# Patient Record
Sex: MALE | URBAN - METROPOLITAN AREA
[De-identification: names, ages, dates, MRNs, and addresses within clinical notes are randomized per-mention and may not be internally consistent; named-entity substitution may affect disease eponyms.]

---

## 2017-09-26 ENCOUNTER — OFFICE VISIT (OUTPATIENT)
Dept: URGENT CARE | Facility: CLINIC | Age: 47
End: 2017-09-26
Payer: COMMERCIAL

## 2017-09-26 VITALS
TEMPERATURE: 98 F | SYSTOLIC BLOOD PRESSURE: 128 MMHG | OXYGEN SATURATION: 98 % | BODY MASS INDEX: 28.6 KG/M2 | RESPIRATION RATE: 20 BRPM | WEIGHT: 230 LBS | HEIGHT: 75 IN | HEART RATE: 88 BPM | DIASTOLIC BLOOD PRESSURE: 85 MMHG

## 2017-09-26 DIAGNOSIS — R30.0 DYSURIA: Primary | ICD-10-CM

## 2017-09-26 DIAGNOSIS — J45.31 MILD PERSISTENT ASTHMA WITH ACUTE EXACERBATION: ICD-10-CM

## 2017-09-26 LAB
BILIRUB UR QL STRIP: NEGATIVE
GLUCOSE UR QL STRIP: NEGATIVE
KETONES UR QL STRIP: NEGATIVE
LEUKOCYTE ESTERASE UR QL STRIP: NEGATIVE
PH, POC UA: 6 (ref 5–8)
POC BLOOD, URINE: NEGATIVE
POC NITRATES, URINE: NEGATIVE
PROT UR QL STRIP: NEGATIVE
SP GR UR STRIP: 1.01 (ref 1–1.03)
UROBILINOGEN UR STRIP-ACNC: NORMAL (ref 0.3–2.2)

## 2017-09-26 PROCEDURE — 3008F BODY MASS INDEX DOCD: CPT | Mod: S$GLB,,, | Performed by: FAMILY MEDICINE

## 2017-09-26 PROCEDURE — 99202 OFFICE O/P NEW SF 15 MIN: CPT | Mod: 25,S$GLB,, | Performed by: FAMILY MEDICINE

## 2017-09-26 PROCEDURE — 81003 URINALYSIS AUTO W/O SCOPE: CPT | Mod: QW,S$GLB,, | Performed by: FAMILY MEDICINE

## 2017-09-26 RX ORDER — MESALAMINE 500 MG/1
500 CAPSULE, EXTENDED RELEASE ORAL 4 TIMES DAILY
COMMUNITY

## 2017-09-26 RX ORDER — FLUTICASONE PROPIONATE AND SALMETEROL 500; 50 UG/1; UG/1
1 POWDER RESPIRATORY (INHALATION) 2 TIMES DAILY
COMMUNITY
End: 2021-06-28

## 2017-09-26 RX ORDER — ASCORBIC ACID 1000 MG
175 TABLET ORAL DAILY
COMMUNITY

## 2017-09-26 RX ORDER — ALBUTEROL SULFATE 0.63 MG/3ML
0.63 SOLUTION RESPIRATORY (INHALATION) EVERY 6 HOURS PRN
COMMUNITY

## 2017-09-26 RX ORDER — CLONAZEPAM 1 MG/1
1 TABLET ORAL 2 TIMES DAILY PRN
COMMUNITY

## 2017-09-26 RX ORDER — ALPRAZOLAM 0.5 MG/1
0.5 TABLET ORAL 3 TIMES DAILY
COMMUNITY

## 2017-09-26 RX ORDER — PREDNISONE 10 MG/1
10 TABLET ORAL DAILY
COMMUNITY
End: 2023-01-06

## 2017-09-26 NOTE — PROGRESS NOTES
"Subjective:       Patient ID: Alex Tinsley is a 47 y.o. male.    Vitals:  height is 6' 3" (1.905 m) and weight is 104.3 kg (230 lb). His oral temperature is 97.7 °F (36.5 °C). His blood pressure is 128/85 and his pulse is 88. His respiration is 20 and oxygen saturation is 98%.     Chief Complaint: Dysuria and Cough    Dysuria    This is a new problem. The current episode started yesterday. The problem occurs every urination. The problem has been gradually worsening. The quality of the pain is described as burning. The pain is at a severity of 3/10. The pain is mild. There has been no fever. The fever has been present for less than 1 day. He is sexually active. Associated symptoms include chills and nausea. Pertinent negatives include no hematuria, urgency, vomiting or rash. He has tried nothing for the symptoms. The treatment provided no relief.   Cough   This is a new problem. The current episode started 1 to 4 weeks ago. The problem has been gradually worsening. The problem occurs constantly. The cough is productive of sputum. Associated symptoms include chills and a fever. Pertinent negatives include no rash. Nothing aggravates the symptoms. He has tried steroid inhaler for the symptoms. The treatment provided mild relief.     Review of Systems   Constitution: Positive for chills and fever.   Eyes: Negative for discharge.   Respiratory: Positive for cough.    Skin: Negative for flushing and rash.   Musculoskeletal: Negative for back pain.   Gastrointestinal: Positive for nausea. Negative for vomiting.   Genitourinary: Positive for dysuria. Negative for genital sores, hematuria and urgency.       Objective:      Physical Exam   Constitutional: He appears well-developed and well-nourished.   HENT:   Head: Normocephalic and atraumatic.   Mouth/Throat: Posterior oropharyngeal erythema present.   Eyes: EOM are normal. Pupils are equal, round, and reactive to light.   Neck: Normal range of motion. Neck supple. "   Cardiovascular: Normal rate, regular rhythm and normal heart sounds.    Pulmonary/Chest: Effort normal. No respiratory distress. He has wheezes (scant). He has no rales. He exhibits no tenderness.   Abdominal: Soft. Bowel sounds are normal.   Nursing note and vitals reviewed.      Assessment:       1. Dysuria    2. Mild persistent asthma with acute exacerbation        Plan:         Dysuria  -     POCT Urinalysis, Dipstick, Automated, W/O Scope    Mild persistent asthma with acute exacerbation    declines further testing of urine, denies risk for STD. Believes he has a cold and is comfortable with his asthma management

## 2017-09-26 NOTE — PATIENT INSTRUCTIONS
Dysuria with Uncertain Cause (Adult)    The urethra is the tube that allows urine to pass out of the body. In a woman, the urethra is the opening above the vagina. In men, the urethra is the opening on the tip of the penis. Dysuria is the feeling of pain or burning in the urethra when passing urine.  Dysuria can be caused by anything that irritates or inflames the urethra. An infection or chemical irritation can cause this reaction. A bladder infection is the most common cause of dysuria in adults. A urine test can diagnose this. A bladder infection needs antibiotic treatment.  Soaps, lotions, colognes and feminine hygiene products can cause dysuria. So can birth control jellies, creams, and foams. It will go away 1 to 3 days after using these irritants.  Sexually transmitted diseases (STDs) such as chlamydia or gonorrhea can cause dysuria. Your healthcare provider may take a culture sample. Your provider may start you on antibiotic medicine before the culture test returns.  In women who have gone through menopause, dysuria can be from dryness in the lining of the urethra. This can be treated with hormones. Dysuria becomes long-term (chronic) when it lasts for weeks or months. You may need to see a specialist (urologist) to diagnose and treat chronic dysuria.  Home care  These home care tips may help:  · Don't use any chemicals or products that you think may be causing your symptoms.  · If you were given a prescription medicine, take as directed. Be sure to take it until it is all used up.  · If a culture was taken, don't have sex until you have been told that it is negative. This means you don't have an infection. Then follow your healthcare provider's advice to treat your condition.  If a culture was done and it is positive:  · Both you and your sexual partner may need to be treated. This is true even if your partner has no symptoms.  · Contact your healthcare provider or go to an urgent care clinic or the  [facility] health department to be looked at and treated.  · Don't have sex until both you and your partner(s) have finished all antibiotics and your healthcare provider says you are no longer contagious.  · Learn about and use safe sex practices. The safest sex is with a partner who has tested negative and only has sex with you. Condoms can prevent STDs from spreading, but they aren't a guarantee.  Follow-up care  Follow up with your healthcare provider, or as advised. If a culture was taken, you may call as directed for the results. If you have an STD, follow up with your provider or the public health department for a complete STD screening, including HIV testing. For more information, contact CDC-INFO at 705-498-1516.  When to seek medical advice  Call your healthcare provider right away if any of these occur:  · You aren't better after 3 days of treatment  · Fever of 100.4ºF (38ºC) or higher, or as directed by your healthcare provider  · Back or belly pain that gets worse  · You can't urinate because of pain  · New discharge from the urethra, vagina, or penis  · Painful sores on the penis  · Rash or joint pain  · Painful lumps (lymph nodes) in the groin  · Testicle pain or swelling of the scrotum  Date Last Reviewed: 11/1/2016  © 9792-2799 [employer]. [address] [place] [address], [place] [address]. All rights reserved. This information is not intended as a substitute for professional medical care. Always follow your healthcare professional's instructions.

## 2017-09-27 ENCOUNTER — TELEPHONE (OUTPATIENT)
Dept: URGENT CARE | Facility: CLINIC | Age: 47
End: 2017-09-27

## 2017-09-27 NOTE — TELEPHONE ENCOUNTER
We got labs back on patient today. They came from [facility]. None was ordered from here. It was from his GI doctor from last week. He has already talked to his doctor concerning these labs. He is doing ok.

## 2020-02-26 ENCOUNTER — OFFICE VISIT (OUTPATIENT)
Dept: URGENT CARE | Facility: CLINIC | Age: 50
End: 2020-02-26
Payer: COMMERCIAL

## 2020-02-26 VITALS
WEIGHT: 240 LBS | HEIGHT: 75 IN | BODY MASS INDEX: 29.84 KG/M2 | HEART RATE: 86 BPM | SYSTOLIC BLOOD PRESSURE: 147 MMHG | RESPIRATION RATE: 12 BRPM | OXYGEN SATURATION: 98 % | TEMPERATURE: 98 F | DIASTOLIC BLOOD PRESSURE: 78 MMHG

## 2020-02-26 DIAGNOSIS — Z20.828 EXPOSURE TO THE FLU: Primary | ICD-10-CM

## 2020-02-26 PROCEDURE — 99203 PR OFFICE/OUTPT VISIT, NEW, LEVL III, 30-44 MIN: ICD-10-PCS | Mod: S$GLB,,, | Performed by: NURSE PRACTITIONER

## 2020-02-26 PROCEDURE — 99203 OFFICE O/P NEW LOW 30 MIN: CPT | Mod: S$GLB,,, | Performed by: NURSE PRACTITIONER

## 2020-02-26 RX ORDER — OSELTAMIVIR PHOSPHATE 75 MG/1
75 CAPSULE ORAL 2 TIMES DAILY
Qty: 10 CAPSULE | Refills: 0 | Status: SHIPPED | OUTPATIENT
Start: 2020-02-26 | End: 2020-02-26

## 2020-02-26 RX ORDER — OSELTAMIVIR PHOSPHATE 75 MG/1
75 CAPSULE ORAL 2 TIMES DAILY
Qty: 10 CAPSULE | Refills: 0 | Status: SHIPPED | OUTPATIENT
Start: 2020-02-26 | End: 2020-03-02

## 2020-02-26 NOTE — PROGRESS NOTES
"Subjective:       Patient ID: Alex Tinsley is a 49 y.o. male.    Vitals:  height is 6' 3" (1.905 m) and weight is 108.9 kg (240 lb). His temperature is 97.6 °F (36.4 °C). His blood pressure is 147/78 (abnormal) and his pulse is 86. His respiration is 12 and oxygen saturation is 98%.     Chief Complaint: Sinus Problem    Pt's wife was seen today and thinks that she may have the flu, pt would like RX   Provider note begins below  No fevers or body aches.  Requesting prophylactic tamiflu.     Sinus Problem   This is a new problem. The current episode started today. The problem has been gradually worsening since onset. He is experiencing no pain. Associated symptoms include sinus pressure and a sore throat. Pertinent negatives include no chills, congestion, coughing, diaphoresis, ear pain or shortness of breath. Past treatments include nothing.       Constitution: Negative for chills, sweating, fatigue and fever.   HENT: Positive for sinus pain, sinus pressure and sore throat. Negative for ear pain, congestion and voice change.    Neck: Negative for painful lymph nodes.   Eyes: Negative for eye redness.   Respiratory: Negative for chest tightness, cough, sputum production, bloody sputum, COPD, shortness of breath, stridor, wheezing and asthma.    Gastrointestinal: Negative for nausea and vomiting.   Musculoskeletal: Negative for muscle ache.   Skin: Negative for rash.   Allergic/Immunologic: Negative for seasonal allergies and asthma.   Hematologic/Lymphatic: Negative for swollen lymph nodes.       Objective:      Physical Exam   Constitutional: He is oriented to person, place, and time. He appears well-developed and well-nourished. He is cooperative.  Non-toxic appearance. He does not have a sickly appearance. He does not appear ill. No distress.   HENT:   Head: Normocephalic and atraumatic.   Right Ear: Hearing, tympanic membrane, external ear and ear canal normal.   Left Ear: Hearing, tympanic membrane, external ear " and ear canal normal.   Nose: Nose normal. No mucosal edema, rhinorrhea or nasal deformity. No epistaxis. Right sinus exhibits no maxillary sinus tenderness and no frontal sinus tenderness. Left sinus exhibits no maxillary sinus tenderness and no frontal sinus tenderness.   Mouth/Throat: Uvula is midline, oropharynx is clear and moist and mucous membranes are normal. No trismus in the jaw. Normal dentition. No uvula swelling. No oropharyngeal exudate, posterior oropharyngeal edema or posterior oropharyngeal erythema.   Eyes: Conjunctivae and lids are normal. No scleral icterus.   Neck: Trachea normal, full passive range of motion without pain and phonation normal. Neck supple. No neck rigidity. No edema and no erythema present.   Cardiovascular: Normal rate, regular rhythm, normal heart sounds, intact distal pulses and normal pulses.   Pulmonary/Chest: Effort normal and breath sounds normal. No stridor. No respiratory distress. He has no decreased breath sounds. He has no wheezes. He has no rhonchi.   Abdominal: Normal appearance.   Musculoskeletal: Normal range of motion. He exhibits no edema or deformity.   Neurological: He is alert and oriented to person, place, and time. He exhibits normal muscle tone. Coordination normal.   Skin: Skin is warm, dry, intact, not diaphoretic and not pale.   Psychiatric: He has a normal mood and affect. His speech is normal and behavior is normal. Judgment and thought content normal. Cognition and memory are normal.   Nursing note and vitals reviewed.        Assessment:       1. Exposure to the flu        Plan:       Discussed benefits and risks of taking tamiflu.    Exposure to the flu  -     oseltamivir (TAMIFLU) 75 MG capsule; Take 1 capsule (75 mg total) by mouth 2 (two) times daily. for 5 days  Dispense: 10 capsule; Refill: 0         Patient Instructions     Influenza (Adult)    Influenza is also called the flu. It is a viral illness that affects the air passages of your  lungs. It is different from the common cold. The flu can easily be passed from one to person to another. It may be spread through the air by coughing and sneezing. Or it can be spread by touching the sick person and then touching your own eyes, nose, or mouth.  The flu starts 1 to 3 days after you are exposed to the flu virus. It may last for 1 to 2 weeks but many people feel tired or fatigued for many weeks afterward. You usually dont need to take antibiotics unless you have a complication. This might be an ear or sinus infection or pneumonia.  Symptoms of the flu may be mild or severe. They can include extreme tiredness (wanting to stay in bed all day), chills, fevers, muscle aches, soreness with eye movement, headache, and a dry, hacking cough.  Home care  Follow these guidelines when caring for yourself at home:  · Avoid being around cigarette smoke, whether yours or other peoples.  · Acetaminophen or ibuprofen will help ease your fever, muscle aches, and headache. Dont give aspirin to anyone younger than 18 who has the flu. Aspirin can harm the liver.  · Nausea and loss of appetite are common with the flu. Eat light meals. Drink 6 to 8 glasses of liquids every day. Good choices are water, sport drinks, soft drinks without caffeine, juices, tea, and soup. Extra fluids will also help loosen secretions in your nose and lungs.  · Over-the-counter cold medicines will not make the flu go away faster. But the medicines may help with coughing, sore throat, and congestion in your nose and sinuses. Dont use a decongestant if you have high blood pressure.  · Stay home until your fever has been gone for at least 24 hours without using medicine to reduce fever.  Follow-up care  Follow up with your healthcare provider, or as advised, if you are not getting better over the next week.  If you are age 65 or older, talk with your provider about getting a pneumococcal vaccine every 5 years. You should also get this vaccine if  you have chronic asthma or COPD. All adults should get a flu vaccine every fall. Ask your provider about this.  When to seek medical advice  Call your healthcare provider right away if any of these occur:  · Cough with lots of colored mucus (sputum) or blood in your mucus  · Chest pain, shortness of breath, wheezing, or trouble breathing  · Severe headache, or face, neck, or ear pain  · New rash with fever  · Fever of 100.4°F (38°C) or higher, or as directed by your healthcare provider  · Confusion, behavior change, or seizure  · Severe weakness or dizziness  · You get a new fever or cough after getting better for a few days  Date Last Reviewed: 1/1/2017  © 9359-6043 [employer]. [address] [place] [address], [place] [address]. All rights reserved. This information is not intended as a substitute for professional medical care. Always follow your healthcare professional's instructions.

## 2020-02-26 NOTE — PATIENT INSTRUCTIONS

## 2021-06-28 ENCOUNTER — OFFICE VISIT (OUTPATIENT)
Dept: PULMONOLOGY | Facility: CLINIC | Age: 51
End: 2021-06-28
Payer: COMMERCIAL

## 2021-06-28 VITALS
SYSTOLIC BLOOD PRESSURE: 123 MMHG | HEIGHT: 75 IN | DIASTOLIC BLOOD PRESSURE: 84 MMHG | WEIGHT: 256.38 LBS | BODY MASS INDEX: 31.88 KG/M2 | RESPIRATION RATE: 18 BRPM | OXYGEN SATURATION: 97 % | HEART RATE: 76 BPM

## 2021-06-28 DIAGNOSIS — J45.30 MILD PERSISTENT ASTHMA, UNSPECIFIED WHETHER COMPLICATED: Primary | ICD-10-CM

## 2021-06-28 PROCEDURE — 1126F AMNT PAIN NOTED NONE PRSNT: CPT | Mod: S$GLB,,, | Performed by: INTERNAL MEDICINE

## 2021-06-28 PROCEDURE — 99213 OFFICE O/P EST LOW 20 MIN: CPT | Mod: S$GLB,,, | Performed by: INTERNAL MEDICINE

## 2021-06-28 PROCEDURE — 3008F BODY MASS INDEX DOCD: CPT | Mod: CPTII,S$GLB,, | Performed by: INTERNAL MEDICINE

## 2021-06-28 PROCEDURE — 99999 PR PBB SHADOW E&M-EST. PATIENT-LVL IV: ICD-10-PCS | Mod: PBBFAC,,, | Performed by: INTERNAL MEDICINE

## 2021-06-28 PROCEDURE — 99213 PR OFFICE/OUTPT VISIT, EST, LEVL III, 20-29 MIN: ICD-10-PCS | Mod: S$GLB,,, | Performed by: INTERNAL MEDICINE

## 2021-06-28 PROCEDURE — 99999 PR PBB SHADOW E&M-EST. PATIENT-LVL IV: CPT | Mod: PBBFAC,,, | Performed by: INTERNAL MEDICINE

## 2021-06-28 PROCEDURE — 1126F PR PAIN SEVERITY QUANTIFIED, NO PAIN PRESENT: ICD-10-PCS | Mod: S$GLB,,, | Performed by: INTERNAL MEDICINE

## 2021-06-28 PROCEDURE — 3008F PR BODY MASS INDEX (BMI) DOCUMENTED: ICD-10-PCS | Mod: CPTII,S$GLB,, | Performed by: INTERNAL MEDICINE

## 2021-06-28 RX ORDER — ALBUTEROL SULFATE 90 UG/1
1 AEROSOL, METERED RESPIRATORY (INHALATION) EVERY 6 HOURS PRN
Qty: 24 G | Refills: 3 | Status: SHIPPED | OUTPATIENT
Start: 2021-06-28 | End: 2022-06-28

## 2021-06-28 RX ORDER — FLUTICASONE PROPIONATE AND SALMETEROL 250; 50 UG/1; UG/1
1 POWDER RESPIRATORY (INHALATION) 2 TIMES DAILY
Qty: 1 EACH | Refills: 11 | Status: SHIPPED | OUTPATIENT
Start: 2021-06-28 | End: 2023-01-03 | Stop reason: SDUPTHER

## 2021-07-08 ENCOUNTER — OFFICE VISIT (OUTPATIENT)
Dept: URGENT CARE | Facility: CLINIC | Age: 51
End: 2021-07-08
Payer: COMMERCIAL

## 2021-07-08 VITALS
WEIGHT: 256 LBS | SYSTOLIC BLOOD PRESSURE: 127 MMHG | TEMPERATURE: 99 F | HEART RATE: 84 BPM | OXYGEN SATURATION: 96 % | DIASTOLIC BLOOD PRESSURE: 76 MMHG | RESPIRATION RATE: 18 BRPM | HEIGHT: 75 IN | BODY MASS INDEX: 31.83 KG/M2

## 2021-07-08 DIAGNOSIS — R42 DIZZINESS: Primary | ICD-10-CM

## 2021-07-08 DIAGNOSIS — H61.22 IMPACTED CERUMEN OF LEFT EAR: ICD-10-CM

## 2021-07-08 LAB
CTP QC/QA: YES
CTP QC/QA: YES
POC MOLECULAR INFLUENZA A AGN: NEGATIVE
POC MOLECULAR INFLUENZA B AGN: NEGATIVE
SARS-COV-2 RDRP RESP QL NAA+PROBE: NEGATIVE

## 2021-07-08 PROCEDURE — 87502 INFLUENZA DNA AMP PROBE: CPT | Mod: QW,S$GLB,, | Performed by: INTERNAL MEDICINE

## 2021-07-08 PROCEDURE — 99214 OFFICE O/P EST MOD 30 MIN: CPT | Mod: S$GLB,,, | Performed by: INTERNAL MEDICINE

## 2021-07-08 PROCEDURE — U0002 COVID-19 LAB TEST NON-CDC: HCPCS | Mod: QW,S$GLB,, | Performed by: INTERNAL MEDICINE

## 2021-07-08 PROCEDURE — U0002: ICD-10-PCS | Mod: QW,S$GLB,, | Performed by: INTERNAL MEDICINE

## 2021-07-08 PROCEDURE — 3008F PR BODY MASS INDEX (BMI) DOCUMENTED: ICD-10-PCS | Mod: CPTII,S$GLB,, | Performed by: INTERNAL MEDICINE

## 2021-07-08 PROCEDURE — 3008F BODY MASS INDEX DOCD: CPT | Mod: CPTII,S$GLB,, | Performed by: INTERNAL MEDICINE

## 2021-07-08 PROCEDURE — 87502 POCT INFLUENZA A/B MOLECULAR: ICD-10-PCS | Mod: QW,S$GLB,, | Performed by: INTERNAL MEDICINE

## 2021-07-08 PROCEDURE — 99214 PR OFFICE/OUTPT VISIT, EST, LEVL IV, 30-39 MIN: ICD-10-PCS | Mod: S$GLB,,, | Performed by: INTERNAL MEDICINE

## 2021-07-08 RX ORDER — ONDANSETRON 4 MG/1
4 TABLET, ORALLY DISINTEGRATING ORAL EVERY 8 HOURS PRN
Qty: 15 TABLET | Refills: 0 | Status: SHIPPED | OUTPATIENT
Start: 2021-07-08 | End: 2021-07-13

## 2021-07-08 RX ORDER — MECLIZINE HYDROCHLORIDE 25 MG/1
25 TABLET ORAL 2 TIMES DAILY PRN
Qty: 14 TABLET | Refills: 0 | Status: SHIPPED | OUTPATIENT
Start: 2021-07-08 | End: 2022-06-09

## 2021-08-09 ENCOUNTER — PATIENT MESSAGE (OUTPATIENT)
Dept: PULMONOLOGY | Facility: CLINIC | Age: 51
End: 2021-08-09

## 2021-09-22 ENCOUNTER — PATIENT MESSAGE (OUTPATIENT)
Dept: PULMONOLOGY | Facility: CLINIC | Age: 51
End: 2021-09-22

## 2022-06-09 ENCOUNTER — OFFICE VISIT (OUTPATIENT)
Dept: PODIATRY | Facility: CLINIC | Age: 52
End: 2022-06-09
Payer: COMMERCIAL

## 2022-06-09 ENCOUNTER — APPOINTMENT (OUTPATIENT)
Dept: RADIOLOGY | Facility: OTHER | Age: 52
End: 2022-06-09
Attending: PODIATRIST
Payer: COMMERCIAL

## 2022-06-09 VITALS
HEIGHT: 75 IN | BODY MASS INDEX: 31.83 KG/M2 | SYSTOLIC BLOOD PRESSURE: 137 MMHG | DIASTOLIC BLOOD PRESSURE: 85 MMHG | HEART RATE: 73 BPM | WEIGHT: 256 LBS

## 2022-06-09 DIAGNOSIS — M79.672 BILATERAL FOOT PAIN: ICD-10-CM

## 2022-06-09 DIAGNOSIS — R20.2 NUMBNESS AND TINGLING OF FOOT: ICD-10-CM

## 2022-06-09 DIAGNOSIS — R20.0 NUMBNESS AND TINGLING OF FOOT: ICD-10-CM

## 2022-06-09 DIAGNOSIS — M79.671 BILATERAL FOOT PAIN: ICD-10-CM

## 2022-06-09 DIAGNOSIS — M21.42 PES PLANUS OF BOTH FEET: ICD-10-CM

## 2022-06-09 DIAGNOSIS — R20.0 NUMBNESS AND TINGLING OF FOOT: Primary | ICD-10-CM

## 2022-06-09 DIAGNOSIS — M21.41 PES PLANUS OF BOTH FEET: ICD-10-CM

## 2022-06-09 DIAGNOSIS — R20.2 NUMBNESS AND TINGLING OF FOOT: Primary | ICD-10-CM

## 2022-06-09 PROCEDURE — 99204 PR OFFICE/OUTPT VISIT, NEW, LEVL IV, 45-59 MIN: ICD-10-PCS | Mod: S$GLB,,, | Performed by: PODIATRIST

## 2022-06-09 PROCEDURE — 99204 OFFICE O/P NEW MOD 45 MIN: CPT | Mod: S$GLB,,, | Performed by: PODIATRIST

## 2022-06-09 PROCEDURE — 1159F PR MEDICATION LIST DOCUMENTED IN MEDICAL RECORD: ICD-10-PCS | Mod: CPTII,S$GLB,, | Performed by: PODIATRIST

## 2022-06-09 PROCEDURE — 3008F BODY MASS INDEX DOCD: CPT | Mod: CPTII,S$GLB,, | Performed by: PODIATRIST

## 2022-06-09 PROCEDURE — 73630 X-RAY EXAM OF FOOT: CPT | Mod: 26,,, | Performed by: RADIOLOGY

## 2022-06-09 PROCEDURE — 3079F DIAST BP 80-89 MM HG: CPT | Mod: CPTII,S$GLB,, | Performed by: PODIATRIST

## 2022-06-09 PROCEDURE — 1160F RVW MEDS BY RX/DR IN RCRD: CPT | Mod: CPTII,S$GLB,, | Performed by: PODIATRIST

## 2022-06-09 PROCEDURE — 99999 PR PBB SHADOW E&M-EST. PATIENT-LVL IV: ICD-10-PCS | Mod: PBBFAC,,, | Performed by: PODIATRIST

## 2022-06-09 PROCEDURE — 73630 X-RAY EXAM OF FOOT: CPT | Mod: TC,50

## 2022-06-09 PROCEDURE — 1160F PR REVIEW ALL MEDS BY PRESCRIBER/CLIN PHARMACIST DOCUMENTED: ICD-10-PCS | Mod: CPTII,S$GLB,, | Performed by: PODIATRIST

## 2022-06-09 PROCEDURE — 99999 PR PBB SHADOW E&M-EST. PATIENT-LVL IV: CPT | Mod: PBBFAC,,, | Performed by: PODIATRIST

## 2022-06-09 PROCEDURE — 3075F PR MOST RECENT SYSTOLIC BLOOD PRESS GE 130-139MM HG: ICD-10-PCS | Mod: CPTII,S$GLB,, | Performed by: PODIATRIST

## 2022-06-09 PROCEDURE — 3075F SYST BP GE 130 - 139MM HG: CPT | Mod: CPTII,S$GLB,, | Performed by: PODIATRIST

## 2022-06-09 PROCEDURE — 3079F PR MOST RECENT DIASTOLIC BLOOD PRESSURE 80-89 MM HG: ICD-10-PCS | Mod: CPTII,S$GLB,, | Performed by: PODIATRIST

## 2022-06-09 PROCEDURE — 73630 XR FOOT COMPLETE 3 VIEW BILATERAL: ICD-10-PCS | Mod: 26,,, | Performed by: RADIOLOGY

## 2022-06-09 PROCEDURE — 3008F PR BODY MASS INDEX (BMI) DOCUMENTED: ICD-10-PCS | Mod: CPTII,S$GLB,, | Performed by: PODIATRIST

## 2022-06-09 PROCEDURE — 1159F MED LIST DOCD IN RCRD: CPT | Mod: CPTII,S$GLB,, | Performed by: PODIATRIST

## 2022-06-09 RX ORDER — AZELASTINE 1 MG/ML
SPRAY, METERED NASAL
COMMUNITY
Start: 2022-04-25

## 2022-06-09 NOTE — PROGRESS NOTES
"PODIATRY NOTE     PATIENT ID:  Alex Tinsley is a 52 y.o. male.     CHIEF CONCERN:   Foot Problem (Bilateral Foot numbness, difficulty walking and flat feet.)           MEDICAL DECISION MAKING:        ICD-10-CM ICD-9-CM    1. Numbness and tingling of foot  R20.0 782.0 X-Ray Foot Complete Bilateral    R20.2  EMG W/ ULTRASOUND AND NERVE CONDUCTION TEST 2 Extremities      ORTHOTIC DEVICE (DME)   2. Bilateral foot pain  M79.671 729.5 EMG W/ ULTRASOUND AND NERVE CONDUCTION TEST 2 Extremities    M79.672  ORTHOTIC DEVICE (DME)   3. Pes planus of both feet  M21.41 734 ORTHOTIC DEVICE (DME)    M21.42         · I counseled the patient on the patient's conditions, their implications and medical management.    · I ordered xrays and reviewed the xrays with the patient.  · EMG.  Follow up after done.     · Custom made foot orthotics.                   HISTORY OF PRESENT ILLNESS:  Alex Tinsley is a 52 y.o. male with concerns regarding:  Bilateral foot numbness  Patient reports symptoms/signs have been present since at least September but worsened past 2 weeks.  Causes him to limp.  Instability.    Trauma/injury to the area:  None recalled.   States that he was a vegan until last week.   Was also taking "supplements" as well.    Has seen outside podiatrist in 2019 who recommended a custom articulated foot ankle orthosis.  It was not obtained at the time.        There is no problem list on file for this patient.        Current Outpatient Medications on File Prior to Visit   Medication Sig Dispense Refill    albuterol (ACCUNEB) 0.63 mg/3 mL Nebu Take 0.63 mg by nebulization every 6 (six) hours as needed. Rescue      albuterol (VENTOLIN HFA) 90 mcg/actuation inhaler Inhale 1 puff into the lungs every 6 (six) hours as needed for Wheezing. Rescue 24 g 3    ALBUTEROL INHL Inhale into the lungs.      alprazolam (XANAX) 0.5 MG tablet Take 0.5 mg by mouth 3 (three) times daily.      azelastine (ASTELIN) 137 mcg (0.1 %) nasal spray " "SMARTSI-2 Spray(s) Both Nares Twice Daily      CETIRIZINE HCL (ZYRTEC ORAL) Take by mouth.      clonazePAM (KLONOPIN) 1 MG tablet Take 1 mg by mouth 2 (two) times daily as needed for Anxiety.      FLUCELVAX QUAD 3219-8380, PF, 60 mcg (15 mcg x 4)/0.5 mL Syrg ADM 0.5ML IM UTD      fluticasone-salmeterol diskus inhaler 250-50 mcg Inhale 1 puff into the lungs 2 (two) times daily. Controller 1 each 11    mesalamine (PENTASA) 500 MG CR capsule Take 500 mg by mouth 4 (four) times daily.      milk thistle 175 mg tablet Take 175 mg by mouth once daily.      predniSONE (DELTASONE) 10 MG tablet Take 10 mg by mouth once daily.      PSEUDOEPHEDRINE HCL (SUDAFED ORAL) Take by mouth.      [DISCONTINUED] meclizine (ANTIVERT) 25 mg tablet Take 1 tablet (25 mg total) by mouth 2 (two) times daily as needed for Dizziness. 14 tablet 0     No current facility-administered medications on file prior to visit.           Review of patient's allergies indicates:   Allergen Reactions    Penicillins Rash               ROS:   General ROS: negative for - chills, fever or night sweats  Respiratory ROS: no cough, shortness of breath, or wheezing  Cardiovascular ROS: no chest pain or dyspnea on exertion  Musculoskeletal ROS: positive for - gait disturbance  Neurological ROS: positive for - gait disturbance  Dermatological ROS: negative for pruritus and rash      EXAM:     Vitals:    22 0848   BP: 137/85   Pulse: 73   Weight: 116.1 kg (256 lb)   Height: 6' 3" (1.905 m)        General:  Alert and Oriented x 3;  No acute distress      Bilateral  Lower extremity exam:    Vascular:    Dorsalis Pedis:  present   Posterior Tibial:  present   Capillary refill time:  3 seconds   Temperature of toes warm to touch   Edema:  Trace       Neurological:      Sharp touch:  decreased   Light touch: decreased   Tinels Sign:  Absent   Mulders Click:   Absent        Dermatological:    Skin: warm, moist, appropriate for age and no suspicious " lesions   Wounds/Ulcers:  Absent   Bruising:  Absent   Erythema:  Absent      Musculoskeletal:    Metatarsophalangeal range of motion:   Full range of motion   Subtalar joint range of motion: full range of motion   Ankle joint range of motion:  full range of motion   Bunions:  Absent   Hammertoes: Absent   Flexible pes planus.              6/9/2022 Imaging:  Pending final report.

## 2022-06-20 ENCOUNTER — TELEPHONE (OUTPATIENT)
Dept: PODIATRY | Facility: CLINIC | Age: 52
End: 2022-06-20
Payer: COMMERCIAL

## 2022-06-20 DIAGNOSIS — R20.2 NUMBNESS AND TINGLING OF FOOT: Primary | ICD-10-CM

## 2022-06-20 DIAGNOSIS — R20.0 NUMBNESS AND TINGLING OF FOOT: Primary | ICD-10-CM

## 2022-06-20 DIAGNOSIS — M79.672 BILATERAL FOOT PAIN: ICD-10-CM

## 2022-06-20 DIAGNOSIS — M79.671 BILATERAL FOOT PAIN: ICD-10-CM

## 2022-06-20 NOTE — TELEPHONE ENCOUNTER
Spoke with patient wife she state Patient cancel EMG appointment.  Need referral for a neuro.                ----- Message from Zaina Culver sent at 6/20/2022  2:52 PM CDT -----  Name of Who is Calling:ARAMIS COSTA          What is the request in detail:The patient states he is returning a call back to the office and he also have a few questions he would also like an referral for a neuro. Please advise   Dr. Razo fax #[phone]       Can the clinic reply by [facility]: no         What Number to Call Back if not in [facility]: [phone]

## 2022-07-11 ENCOUNTER — TELEPHONE (OUTPATIENT)
Dept: PODIATRY | Facility: CLINIC | Age: 52
End: 2022-07-11
Payer: COMMERCIAL

## 2022-07-11 NOTE — TELEPHONE ENCOUNTER
Staff informed patient that he would have to give medical records a call for copy of X-ray. Staff gave the patient the number to medical records to call.      Patient verbalized understanding.        ----- Message from Michelle Nails sent at 7/11/2022  4:46 PM CDT -----  Name of Who is Calling:              What is the request in detail:Patient is requesting a copy of xray.            Can the clinic reply by [facility]:yes              [facility][phone]

## 2023-01-06 ENCOUNTER — OFFICE VISIT (OUTPATIENT)
Dept: PULMONOLOGY | Facility: CLINIC | Age: 53
End: 2023-01-06
Payer: COMMERCIAL

## 2023-01-06 DIAGNOSIS — J45.30 MILD PERSISTENT ASTHMA WITHOUT COMPLICATION: Primary | ICD-10-CM

## 2023-01-06 PROCEDURE — 99213 OFFICE O/P EST LOW 20 MIN: CPT | Mod: 95,,, | Performed by: INTERNAL MEDICINE

## 2023-01-06 PROCEDURE — 99213 PR OFFICE/OUTPT VISIT, EST, LEVL III, 20-29 MIN: ICD-10-PCS | Mod: 95,,, | Performed by: INTERNAL MEDICINE

## 2023-01-06 RX ORDER — FLUTICASONE PROPIONATE AND SALMETEROL 100; 50 UG/1; UG/1
1 POWDER RESPIRATORY (INHALATION) 2 TIMES DAILY
Qty: 60 EACH | Refills: 11 | Status: SHIPPED | OUTPATIENT
Start: 2023-01-06

## 2023-01-06 NOTE — PROGRESS NOTES
Virtual Video Pulmonary Clinic Visit          2023       The patient has given consent for a virtual visit either by video or by phone. The patient understands that there could be security /privacy concerns if a non Epic system is required. The patient understands that they will be charged for a virtual visit.    Todays visit is being performed via video visit. I have confirmed that the patient is currently located in the [facility] at patient is resident. The participants of this video visit are Alex Tinsley, MRN:[id number] and myself.      Chief Complaint:  Asthma      HPI:  This patient is a 52 y.o. Patient male with a history of longstanding asthma that is generally well controlled.  He has been on Advair/Wixela for many years.  He was previously followed by Dr. Hill in the [facility] but she is evidently no longer in our system.  He states that his asthma control has been excellent and only has to use his Advair/Wixela about 3 to 4 times a week.  No rescue inhaler use.  Inquiring about possibly going to the lowest inhaled corticosteroid dose at 100 mcg.  No other complaints at this time    Past Medical History:   Diagnosis Date    Allergy     Asthma              Past History reviewed. No pertinent family history.     There are no problems to display for this patient.           Review of patient's allergies indicates:   Allergen Reactions    Penicillins Rash          Current Outpatient Medications   Medication Sig Dispense Refill    albuterol (ACCUNEB) 0.63 mg/3 mL Nebu Take 0.63 mg by nebulization every 6 (six) hours as needed. Rescue      ALBUTEROL INHL Inhale into the lungs.      alprazolam (XANAX) 0.5 MG tablet Take 0.5 mg by mouth 3 (three) times daily.      azelastine (ASTELIN) 137 mcg (0.1 %) nasal spray SMARTSI-2 Spray(s) Both Nares Twice Daily      CETIRIZINE HCL (ZYRTEC ORAL) Take by mouth.      clonazePAM (KLONOPIN) 1 MG tablet Take 1 mg by mouth 2 (two) times daily as needed for  Anxiety.      FLUCELVAX QUAD 9767-0296, PF, 60 mcg (15 mcg x 4)/0.5 mL Syrg ADM 0.5ML IM UTD      fluticasone-salmeterol diskus inhaler 100-50 mcg Inhale 1 puff into the lungs 2 (two) times daily. Controller 60 each 11    mesalamine (PENTASA) 500 MG CR capsule Take 500 mg by mouth 4 (four) times daily.      milk thistle 175 mg tablet Take 175 mg by mouth once daily.      predniSONE (DELTASONE) 10 MG tablet Take 10 mg by mouth once daily.      PSEUDOEPHEDRINE HCL (SUDAFED ORAL) Take by mouth.       No current facility-administered medications for this visit.        Review of Systems:    Skin: no new rash  Neuro: no recent tia or syncope  HEENT: no new deafness; no new blindness  GI: no nausea,diarrhea or bleeding  Pulm: see hpi  ROS otherwise unremarkable for new issues    Physical Exam:      RESP: no respiratory distress,stridor or tachypnea  HEENT: normocephalic, eom intact; no alopecia  SKIN: no rash  CHEST: no phthisical changes  ABDOMEN: not distended  NEURO: alert ,oriented x 3, moves all extremities  GAIT: normal  CVS: no obvious JVD, no edema  EXTREMITIES: no clubbing  PSY: normal affect and judgment      Recent lab studies and radiological images reviewed and interpreted.    Assessment:    1. Mild persistent asthma without complication  fluticasone-salmeterol diskus inhaler 100-50 mcg           Plan:  Plan is to continue Wixela but we will lower to the 100 mcg dose.  I sent in a new prescription for this today.  Otherwise his asthma is well controlled with no need for rescue inhalers.  We will see him back in 1 year's time.  We will try and get him to see somebody in the [place] area at his request.  I instructed him to notify us should he have any pulmonary care needs.    Follow up with primary care re general medical problems    Return to clinic in 1 year          25 minutes were spent virtual face to face with Alex Tinsley with greater than 50% of the time spent in counseling or coordination of care  including discussion of symptoms, problems noted, review of records, lab and images with patient as well as diagnosis, benefits and risks of treatment plan and further evaluation.  All of the patient's questions were answered during this discussion.

## 2023-03-22 ENCOUNTER — PATIENT MESSAGE (OUTPATIENT)
Dept: PULMONOLOGY | Facility: CLINIC | Age: 53
End: 2023-03-22
Payer: COMMERCIAL

## 2023-03-24 DIAGNOSIS — J45.20 MILD INTERMITTENT ASTHMA IN ADULT WITHOUT COMPLICATION: Primary | ICD-10-CM

## 2023-03-24 RX ORDER — ALBUTEROL SULFATE 90 UG/1
2 AEROSOL, METERED RESPIRATORY (INHALATION) EVERY 6 HOURS PRN
Qty: 18 G | Refills: 11 | Status: SHIPPED | OUTPATIENT
Start: 2023-03-24

## 2023-03-30 DIAGNOSIS — J45.40 MODERATE PERSISTENT ASTHMA WITHOUT COMPLICATION: Primary | ICD-10-CM

## 2023-03-30 RX ORDER — ALBUTEROL SULFATE 90 UG/1
2 AEROSOL, METERED RESPIRATORY (INHALATION) EVERY 6 HOURS PRN
Qty: 8 G | Refills: 11 | Status: SHIPPED | OUTPATIENT
Start: 2023-03-30

## 2025-01-31 DIAGNOSIS — M54.50 LOW BACK PAIN, UNSPECIFIED BACK PAIN LATERALITY, UNSPECIFIED CHRONICITY, UNSPECIFIED WHETHER SCIATICA PRESENT: Primary | ICD-10-CM

## 2025-02-04 ENCOUNTER — HOSPITAL ENCOUNTER (OUTPATIENT)
Dept: RADIOLOGY | Facility: HOSPITAL | Age: 55
Discharge: HOME | End: 2025-02-04
Attending: ORTHOPAEDIC SURGERY
Payer: COMMERCIAL

## 2025-02-04 ENCOUNTER — OFFICE VISIT (OUTPATIENT)
Dept: ORTHOPEDICS | Facility: CLINIC | Age: 55
End: 2025-02-04
Payer: COMMERCIAL

## 2025-02-04 DIAGNOSIS — M47.816 LUMBAR SPONDYLOSIS: ICD-10-CM

## 2025-02-04 DIAGNOSIS — M54.50 LOW BACK PAIN, UNSPECIFIED BACK PAIN LATERALITY, UNSPECIFIED CHRONICITY, UNSPECIFIED WHETHER SCIATICA PRESENT: ICD-10-CM

## 2025-02-04 DIAGNOSIS — M54.9 DORSALGIA, UNSPECIFIED: ICD-10-CM

## 2025-02-04 DIAGNOSIS — M54.16 LUMBAR RADICULOPATHY: ICD-10-CM

## 2025-02-04 DIAGNOSIS — M51.362 DEGENERATION OF INTERVERTEBRAL DISC OF LUMBAR REGION WITH DISCOGENIC BACK PAIN AND LOWER EXTREMITY PAIN: Primary | ICD-10-CM

## 2025-02-04 PROCEDURE — 72110 X-RAY EXAM L-2 SPINE 4/>VWS: CPT | Mod: 26,,, | Performed by: RADIOLOGY

## 2025-02-04 PROCEDURE — 99999 PR PBB SHADOW E&M-EST. PATIENT-LVL III: CPT | Mod: PBBFAC,,, | Performed by: ORTHOPAEDIC SURGERY

## 2025-02-04 PROCEDURE — 72110 X-RAY EXAM L-2 SPINE 4/>VWS: CPT | Mod: TC

## 2025-02-04 PROCEDURE — 99204 OFFICE O/P NEW MOD 45 MIN: CPT | Mod: S$GLB,,, | Performed by: ORTHOPAEDIC SURGERY

## 2025-02-04 NOTE — PROGRESS NOTES
DATE: 2/4/2025  PATIENT: Alex Tinsley    Attending Physician: Jarad Buck M.D.    CHIEF COMPLAINT: LBP and BLE radiculopathy    HISTORY:  Alex Tinsley is a 54 y.o. male presents for initial evaluation of low back and bilateral leg pain (Back - 2, Leg - 3). The pain has been present for at least 2 years. The patient describes the pain as sharp when it occurs and feelings of instability.  The pain is worse with certain movements and improved by changes in position. There is associated numbness and tingling. There is no subjective weakness. Prior treatments have included ibuprofen when needed, but no PT, injections, or surgery.    The Patient denies myelopathic symptoms such as handwriting changes or difficulty with buttons/coins/keys. Denies perineal paresthesias, bowel/bladder dysfunction.    The patient does not smoke, have DM or endorse IVDU. The patient is not on any blood thinners and does not take chronic narcotics. He works as a .    PAST MEDICAL/SURGICAL HISTORY:  Past Medical History:   Diagnosis Date    Allergy     Asthma      History reviewed. No pertinent surgical history.    Current Medications:   Current Outpatient Medications:     albuterol (ACCUNEB) 0.63 mg/3 mL Nebu, Take 0.63 mg by nebulization every 6 (six) hours as needed. Rescue, Disp: , Rfl:     albuterol (PROAIR HFA) 90 mcg/actuation inhaler, Inhale 2 puffs into the lungs every 6 (six) hours as needed for Wheezing. Rescue, Disp: 8 g, Rfl: 11    alprazolam (XANAX) 0.5 MG tablet, Take 0.5 mg by mouth 3 (three) times daily., Disp: , Rfl:     CETIRIZINE HCL (ZYRTEC ORAL), Take by mouth., Disp: , Rfl:     clonazePAM (KLONOPIN) 1 MG tablet, Take 1 mg by mouth 2 (two) times daily as needed for Anxiety., Disp: , Rfl:     fluticasone-salmeterol diskus inhaler 100-50 mcg, Inhale 1 puff into the lungs 2 (two) times daily. Controller, Disp: 60 each, Rfl: 11    milk thistle 175 mg tablet, Take 175 mg by mouth once daily., Disp: , Rfl:      PSEUDOEPHEDRINE HCL (SUDAFED ORAL), Take by mouth., Disp: , Rfl:     albuterol (VENTOLIN HFA) 90 mcg/actuation inhaler, Inhale 2 puffs into the lungs every 6 (six) hours as needed for Wheezing. Rescue, Disp: 18 g, Rfl: 11    ALBUTEROL INHL, Inhale into the lungs., Disp: , Rfl:     azelastine (ASTELIN) 137 mcg (0.1 %) nasal spray, SMARTSI-2 Spray(s) Both Nares Twice Daily, Disp: , Rfl:     FLUCELVAX QUAD 2595-7602, PF, 60 mcg (15 mcg x 4)/0.5 mL Syrg, ADM 0.5ML IM UTD, Disp: , Rfl:     mesalamine (PENTASA) 500 MG CR capsule, Take 500 mg by mouth 4 (four) times daily., Disp: , Rfl:     Social History:   Social History     Socioeconomic History    Marital status:    Tobacco Use    Smoking status: Never    Smokeless tobacco: Never   Substance and Sexual Activity    Alcohol use: Yes    Drug use: No    Sexual activity: Yes     Social Drivers of Health     Financial Resource Strain: Low Risk  (2/3/2025)    Overall Financial Resource Strain (CARDIA)     Difficulty of Paying Living Expenses: Not very hard   Food Insecurity: No Food Insecurity (2/3/2025)    Hunger Vital Sign     Worried About Running Out of Food in the Last Year: Never true     Ran Out of Food in the Last Year: Never true   Transportation Needs: No Transportation Needs (2024)    Received from [facility] - Transportation     Lack of Transportation (Medical): No     Lack of Transportation (Non-Medical): No   Physical Activity: Insufficiently Active (2/3/2025)    Exercise Vital Sign     Days of Exercise per Week: 2 days     Minutes of Exercise per Session: 30 min   Stress: No Stress Concern Present (2/3/2025)    [employer] [place] [employer] - Occupational Stress Questionnaire     Feeling of Stress : Only a little   Housing Stability: Unknown (2/3/2025)    Housing Stability Vital Sign     Unable to Pay for Housing in the Last Year: No       REVIEW OF SYSTEMS:  Constitution: Negative. Negative for chills, fever and night  sweats.   Cardiovascular: Negative for chest pain and syncope.   Respiratory: Negative for cough and shortness of breath.   Gastrointestinal: See HPI. Negative for nausea/vomiting. Negative for abdominal pain.  Genitourinary: See HPI. Negative for discoloration or dysuria.  Hematologic/Lymphatic: Negative for bleeding/clotting disorders.   Musculoskeletal: Negative for falls and muscle weakness.   Neurological: See HPI. No history of seizures. No history of cranial surgery or shunts.  Neurological: See HPI. No seizures.   Endocrine: Negative for polydipsia, polyphagia and polyuria.   Allergic/Immunologic: Negative for hives and persistent infections.     EXAM:  There were no vitals taken for this visit.    PHYSICAL EXAMINATION:    General: The patient is a 54 y.o. male in no apparent distress, the patient is orientatied to person, place and time.  Psych: Normal mood and affect  HEENT: Vision grossly intact, hearing intact to the spoken word.  Lungs: Respirations unlabored.  Gait: Normal station and gait, no difficulty with toe or heel walk.   Skin: Dorsal lumbar skin negative for rashes, lesions, hairy patches and surgical scars. There is no lumbar tenderness to palpation.  Range of motion: Lumbar range of motion is acceptable.  Spinal Balance: Global saggital and coronal spinal balance acceptable, no significant for scoliosis and kyphosis.  Musculoskeletal: No pain with the range of motion of the bilateral hips. No trochanteric tenderness to palpation.  Vascular: Bilateral lower extremities warm and well perfused, Dorsalis pedis pulses 2+ bilaterally.  Neurological: Normal strength and tone in all major motor groups in the bilateral lower extremities. Normal sensation to light touch in the L2-S1 dermatomes bilaterally.  Deep tendon reflexes symmetric in the bilateral lower extremities.  Negative Babinski bilaterally. Straight leg raise negative bilaterally.    IMAGING:   Today I independently reviewed the following  images and my interpretations are as follows:    AP, Lat and Flex/Ex  upright L-spine demonstrate spondylosis and DDD worst at L5-S1.      There is no height or weight on file to calculate BMI.  % HEMOGLOBIN A1C   Date Value Ref Range Status   06/07/2022 5.0 <5.7 % of total Hgb Final     Comment:     For the purpose of screening for the presence of  diabetes:    <5.7%       Consistent with the absence of diabetes  5.7-6.4%    Consistent with increased risk for diabetes              (prediabetes)  > or =6.5%  Consistent with diabetes    This assay result is consistent with a decreased risk  of diabetes.    Currently, no consensus exists regarding use of  hemoglobin A1c for diagnosis of diabetes in children.    According to American Diabetes Association (ADA)  guidelines, hemoglobin A1c <7.0% represents optimal  control in non-pregnant diabetic patients. Different  metrics may apply to specific patient populations.   Standards of Medical Care in Diabetes(ADA).           ASSESSMENT/PLAN:    Alex was seen today for numbness, numbness and back pain.    Diagnoses and all orders for this visit:    Degeneration of intervertebral disc of lumbar region with discogenic back pain and lower extremity pain    Lumbar spondylosis  -     Ambulatory Referral/Consult to Physical Therapy/Occupational Therapy; Future    Dorsalgia, unspecified  -     MRI Lumbar Spine Without Contrast; Future    Lumbar radiculopathy      Follow up in 4 weeks (on 3/4/2025).    Patient has lumbar spondylosis and BLEradiculopathy. I discussed the natural history of their diagnoses as well as surgical and nonsurgical treatment options. I educated the patient on the importance of core/back strengthening, correct posture, bending/lifting ergonomics, and low-impact aerobic exercises (walking, elliptical, and aquatherapy). I will refer the patient to PT for core stretching/streghtening. I ordered lumbar MRI to evaluate stenosis. Patient will follow up in 4 weeks  for MRI review.    I have personally examined the patient and agree with the above plan.    Jarad Buck MD  Orthopaedic Spine Surgeon  Department of Orthopaedic Surgery  [phone]

## 2025-02-14 ENCOUNTER — TELEPHONE (OUTPATIENT)
Dept: ORTHOPEDICS | Facility: CLINIC | Age: 55
End: 2025-02-14
Payer: COMMERCIAL

## 2025-02-14 ENCOUNTER — PATIENT MESSAGE (OUTPATIENT)
Dept: ORTHOPEDICS | Facility: CLINIC | Age: 55
End: 2025-02-14
Payer: COMMERCIAL

## 2025-02-14 NOTE — TELEPHONE ENCOUNTER
"Returned the patient's call to address the insurance denial for the MRI. I informed the patient that the denial occurred because he had not completed 6 weeks of physical therapy or 3 months of conservative treatment without relief. The patient became upset, expressing frustration that his appointments had been canceled without his knowledge. I apologized for the lack of communication and informed the patient that I was unsure why he was not notified.  The patient continued to voice concerns regarding the [facility] system, the staff, and his insurance. I explained the next step required by his insurance, which is physical therapy. The patient stated that he could not participate in physical therapy due to symptoms including numbness, sharp pain, and seeing bright lights. He further argued that "Dr. Buck would never order an MRI if he knew physical therapy would help."  I attempted to clarify the insurance policy and protocol, explaining that these policies are beyond our control. The patient responded, stating, "You're talking about my financial status now, and you have no clue if I can pay for it." I reassured him that I had not discussed his financial status and offered to reorder the MRI and help schedule the appointment, advising that he could contact the imaging center for cash pricing options.  Despite my efforts to address the patient's concerns, he continued to argue about the system and my nursing abilities. I suggested that the patient contact Patient Relations for further discussion, as he was not receptive to the solutions I presented. The patient persisted with negative remarks regarding me and the system.  I explained that he could attend the initial physical therapy evaluation, and if the therapist determined that physical therapy was not appropriate due to his symptoms, they would document this in his record and we could then resubmit the MRI order for authorization. The patient remained upset and " continued to argue about the canceled appointments.  After multiple attempts to end the call, the patient stated he would pay out of pocket for the MRI and find an alternative facility. I informed him that once he identified an imaging center, the MRI orders would be faxed and a follow-up appointment would be scheduled to review the results. I wished the patient a good day and weekend, but he refused to hang up. I eventually ended the call

## 2025-02-18 ENCOUNTER — TELEPHONE (OUTPATIENT)
Dept: ORTHOPEDICS | Facility: CLINIC | Age: 55
End: 2025-02-18
Payer: COMMERCIAL

## 2025-02-18 ENCOUNTER — PATIENT MESSAGE (OUTPATIENT)
Dept: ORTHOPEDICS | Facility: CLINIC | Age: 55
End: 2025-02-18
Payer: COMMERCIAL

## 2025-02-18 DIAGNOSIS — M54.9 DORSALGIA, UNSPECIFIED: Primary | ICD-10-CM

## 2025-02-18 NOTE — TELEPHONE ENCOUNTER
Spoke with patient and informed we will do peer to peer with insurance but it still isn't a guarantee that it will be approved without a PT eval. Patient not understanding but states he will wait for the final decision.    ----- Message from Carmela sent at 2/18/2025  1:29 PM CST -----  Name of Caller: Nature of Call: requesting a call back Best Call Back Number: [phone] Additional Information:Alex Tinsley calling regarding Patient Advice for wanting to speak with Brielle about the pre-auth issue for the MRI. Please  call back to assist

## 2025-02-19 ENCOUNTER — PATIENT MESSAGE (OUTPATIENT)
Dept: ORTHOPEDICS | Facility: CLINIC | Age: 55
End: 2025-02-19
Payer: COMMERCIAL

## 2025-02-20 ENCOUNTER — PATIENT MESSAGE (OUTPATIENT)
Dept: ORTHOPEDICS | Facility: CLINIC | Age: 55
End: 2025-02-20
Payer: COMMERCIAL

## 2025-05-20 ENCOUNTER — OFFICE VISIT (OUTPATIENT)
Dept: PULMONOLOGY | Facility: CLINIC | Age: 55
End: 2025-05-20
Payer: COMMERCIAL

## 2025-05-20 DIAGNOSIS — J45.30 MILD PERSISTENT ASTHMA WITHOUT COMPLICATION: ICD-10-CM

## 2025-05-20 PROCEDURE — 98004 SYNCH AUDIO-VIDEO EST SF 10: CPT | Mod: 95,,, | Performed by: INTERNAL MEDICINE

## 2025-05-20 RX ORDER — FLUTICASONE PROPIONATE AND SALMETEROL 100; 50 UG/1; UG/1
1 POWDER RESPIRATORY (INHALATION) 2 TIMES DAILY
Qty: 60 EACH | Refills: 11 | Status: SHIPPED | OUTPATIENT
Start: 2025-05-20

## 2025-05-21 NOTE — PROGRESS NOTES
Virtual Video Pulmonary Clinic Visit          5/21/2025       The patient has given consent for a virtual visit either by video or by phone. The patient understands that there could be security /privacy concerns if a non Epic system is required. The patient understands that they will be charged for a virtual visit.    Todays visit is being performed via video visit. I have confirmed that the patient is currently located in the State of [place] at patient's residence. The participants of this video visit are Alex Macams, MRN:[id number] and myself.      Chief Complaint: f/u asthma      HPI:  This patient is a 54 y.o. Patient Refused male seen by me in 2023  Asthma well controlled on advair BID  Needs refill  No complaints    Past Medical History:   Diagnosis Date    Allergy     Asthma       Review of patient's allergies indicates:   Allergen Reactions    Penicillins Rash    Current Medications[1]     Review of Systems: otherwise negative  Skin: no new rash  Neuro: no recent tia or syncope  HEENT: no new deafness; no new blindness  GI: no nausea,diarrhea or bleeding  Pulm: see hpi  ROS otherwise unremarkable for new issues    Physical Exam:  RESP: no respiratory distress,stridor or tachypnea  HEENT: normocephalic, eom intact; no alopecia  CHEST: no phthisical changes  NEURO: alert ,oriented x 3, moves all extremities  GAIT: normal  CVS: no obvious JVD, no edema  PSY: normal affect and judgment      Recent lab studies and radiological images reviewed and interpreted.    Assessment:    1. Mild persistent asthma without complication  fluticasone-salmeterol diskus inhaler 100-50 mcg           Plan:  Refilled advair  Return PRN  Follow up with primary care re general medical problems    15 minutes were spent virtual face to face with Alex Tinsley with greater than 50% of the time spent in counseling or coordination of care including discussion of symptoms, problems noted, review of records, lab and images with patient as  well as diagnosis, benefits and risks of treatment plan and further evaluation.  All of the patient's questions were answered during this discussion.          [1]   Current Outpatient Medications   Medication Sig Dispense Refill    albuterol (ACCUNEB) 0.63 mg/3 mL Nebu Take 0.63 mg by nebulization every 6 (six) hours as needed. Rescue      albuterol (PROAIR HFA) 90 mcg/actuation inhaler Inhale 2 puffs into the lungs every 6 (six) hours as needed for Wheezing. Rescue 8 g 11    albuterol (VENTOLIN HFA) 90 mcg/actuation inhaler Inhale 2 puffs into the lungs every 6 (six) hours as needed for Wheezing. Rescue 18 g 11    ALBUTEROL INHL Inhale into the lungs.      alprazolam (XANAX) 0.5 MG tablet Take 0.5 mg by mouth 3 (three) times daily.      azelastine (ASTELIN) 137 mcg (0.1 %) nasal spray SMARTSI-2 Spray(s) Both Nares Twice Daily      CETIRIZINE HCL (ZYRTEC ORAL) Take by mouth.      clonazePAM (KLONOPIN) 1 MG tablet Take 1 mg by mouth 2 (two) times daily as needed for Anxiety.      FLUCELVAX QUAD 2496-5502, PF, 60 mcg (15 mcg x 4)/0.5 mL Syrg ADM 0.5ML IM UTD      fluticasone-salmeterol diskus inhaler 100-50 mcg Inhale 1 puff into the lungs 2 (two) times daily. Controller 60 each 11    mesalamine (PENTASA) 500 MG CR capsule Take 500 mg by mouth 4 (four) times daily.      milk thistle 175 mg tablet Take 175 mg by mouth once daily.      PSEUDOEPHEDRINE HCL (SUDAFED ORAL) Take by mouth.       No current facility-administered medications for this visit.